# Patient Record
(demographics unavailable — no encounter records)

---

## 2025-01-13 NOTE — PHYSICAL EXAM
[Normal Oropharynx] : the oropharynx was normal [de-identified] : Bilateral cerumen impaction noted/  pale pink swollen sinus turbinates with copious clear watery mucous discharge noted bilaterally without tenderness along sinus turbinates [Normal] : affect was normal and insight and judgment were intact

## 2025-01-13 NOTE — ASSESSMENT
[FreeTextEntry1] : Patient had blood work drawn then then  I attempted to irrigate patient's right ear but had to discontinue the procedure when she became light headed and was in pain.  She experienced a near syncopal episode where she became hypotensive and diaphoretic.  She did not have a syncopal episode.  She was given fluids to taken orally along with some cookies and blood pressure returned to baseline and she was feeling well before being discharged home

## 2025-01-13 NOTE — REVIEW OF SYSTEMS
[Fatigue] : fatigue [Hoarseness] : hoarseness [Nasal Discharge] : nasal discharge [Postnasal Drip] : postnasal drip [Shortness Of Breath] : no shortness of breath [Wheezing] : no wheezing [Cough] : cough [Dyspnea on Exertion] : no dyspnea on exertion

## 2025-01-13 NOTE — HISTORY OF PRESENT ILLNESS
[FreeTextEntry8] : Patient presents with several issues.  She had a  "fishy" foul vaginal discharge last week.  She saw her gynecologist and vaginal cultures and urinary cultures came back negative.  She was empirically treated with metronidazole for 5 days for bacterial vaginosis.  Today she presents complaining of 1 day of urinary frequency and mild dysuria and pressure.  She denies any hematuria or flank pain.  She also reports 1 day history of bilateral sinus congestion with mild hoarse voice and sore throat and dry cough that started yesterday.  She denies any wheezing or shortness of breath.  She also complains of bilateral cerumen impaction and is requesting ear irrigation if possible.

## 2025-01-13 NOTE — PHYSICAL EXAM
[Normal Oropharynx] : the oropharynx was normal [de-identified] : Bilateral cerumen impaction noted/  pale pink swollen sinus turbinates with copious clear watery mucous discharge noted bilaterally without tenderness along sinus turbinates [Normal] : affect was normal and insight and judgment were intact

## 2025-01-27 NOTE — HISTORY OF PRESENT ILLNESS
[FreeTextEntry1] : Pt. presents for a comprehensive evaluation for multiple medical issues.\par   [de-identified] : Patient has been in overall stable health this past year Has mild URI symptoms today.  Pt sees neurologist Dr. Jacome yearly and also saw him last year for left mandibular pain  and was diagnosed with trigeminal neuralgia-by MRI findings-now symptoms have resolved.  Pt completed experimental protocol/ study with [facility] 03/2020.  She gets f/u with her [place]ologist q 6 months [place].. [place] [facility] [place] Prolia q 6months -but last dose was held because she is undergoing extensive dental work. She gets yearly f/u with her breast surgeon Dr. Jagdeep Sidhu q year-last visit was ~7/2021 She underwent left breast reconstruction 12/11/2018 and is doing well and is pleased with the results-saw plastic surgeon Dr. Emeli Coe's PA this past year.

## 2025-01-27 NOTE — PHYSICAL EXAM
[No Acute Distress] : no acute distress [Well Nourished] : well nourished [Well Developed] : well developed [Well-Appearing] : well-appearing [Normal Sclera/Conjunctiva] : normal sclera/conjunctiva [PERRL] : pupils equal round and reactive to light [EOMI] : extraocular movements intact [Normal Outer Ear/Nose] : the outer ears and nose were normal in appearance [No JVD] : no jugular venous distention [No Lymphadenopathy] : no lymphadenopathy [Supple] : supple [Thyroid Normal, No Nodules] : the thyroid was normal and there were no nodules present [No Respiratory Distress] : no respiratory distress  [No Accessory Muscle Use] : no accessory muscle use [Clear to Auscultation] : lungs were clear to auscultation bilaterally [Normal Rate] : normal rate  [Regular Rhythm] : with a regular rhythm [Normal S1, S2] : normal S1 and S2 [No Murmur] : no murmur heard [No Carotid Bruits] : no carotid bruits [No Abdominal Bruit] : a ~M bruit was not heard ~T in the abdomen [No Varicosities] : no varicosities [Pedal Pulses Present] : the pedal pulses are present [No Edema] : there was no peripheral edema [No Palpable Aorta] : no palpable aorta [No Extremity Clubbing/Cyanosis] : no extremity clubbing/cyanosis [Soft] : abdomen soft [Non Tender] : non-tender [Non-distended] : non-distended [No Masses] : no abdominal mass palpated [No HSM] : no HSM [Normal Bowel Sounds] : normal bowel sounds [No CVA Tenderness] : no CVA  tenderness [No Spinal Tenderness] : no spinal tenderness [No Joint Swelling] : no joint swelling [Grossly Normal Strength/Tone] : grossly normal strength/tone [No Rash] : no rash [Coordination Grossly Intact] : coordination grossly intact [No Focal Deficits] : no focal deficits [Normal Gait] : normal gait [Deep Tendon Reflexes (DTR)] : deep tendon reflexes were 2+ and symmetric [Normal Affect] : the affect was normal [Normal Insight/Judgement] : insight and judgment were intact [de-identified] : s/p bilateral mastectomy with reconstruction

## 2025-01-27 NOTE — HEALTH RISK ASSESSMENT
[Good] : ~his/her~  mood as  good [Never (0 pts)] : Never (0 points) [No] : In the past 12 months have you used drugs other than those required for medical reasons? No [No falls in past year] : Patient reported no falls in the past year [0] : 2) Feeling down, depressed, or hopeless: Not at all (0) [Never] : Never [NO] : No [Patient declined mammogram] : Patient declined mammogram [Patient reported PAP Smear was normal] : Patient reported PAP Smear was normal [Patient reported bone density results were abnormal] : Patient reported bone density results were abnormal [Patient reported colonoscopy was normal] : Patient reported colonoscopy was normal [HIV test declined] : HIV test declined [Hepatitis C test declined] : Hepatitis C test declined [None] : None [With Significant Other] : lives with significant other [# of Members in Household ___] :  household currently consist of [unfilled] member(s) [Retired] : retired [Graduate School] : graduate school [] :  [# Of Children ___] : has [unfilled] children [Sexually Active] : sexually active [Feels Safe at Home] : Feels safe at home [Fully functional (bathing, dressing, toileting, transferring, walking, feeding)] : Fully functional (bathing, dressing, toileting, transferring, walking, feeding) [Fully functional (using the telephone, shopping, preparing meals, housekeeping, doing laundry, using] : Fully functional and needs no help or supervision to perform IADLs (using the telephone, shopping, preparing meals, housekeeping, doing laundry, using transportation, managing medications and managing finances) [Reports normal functional visual acuity (ie: able to read med bottle)] : Reports normal functional visual acuity [Reports changes in dental health] : Reports changes in dental health [Smoke Detector] : smoke detector [Carbon Monoxide Detector] : carbon monoxide detector [Seat Belt] :  uses seat belt [Sunscreen] : uses sunscreen [With Patient/Caregiver] : , with patient/caregiver [Designated Healthcare Proxy] : Designated healthcare proxy [Name: ___] : Health Care Proxy's Name: [unfilled]  [Relationship: ___] : Relationship: [unfilled] [de-identified] : none [de-identified] : sees oncologist Dr. Mansfield at [facility] [place] -gets Prolia/ also saw neurologist  q 6months for h/o seizure hx and for new trigeminal neuralgia-last visit was  1/2023/ physiatrist Dr. Man at [facility] [Audit-CScore] : 0 [de-identified] : no routine exercise since the the start of the COVID 19 pandemic -stopped working out with the -now active with the baby [de-identified] : overall healthy balanced and varied diet without any exclusions  [[id number]] : 0 [Change in mental status noted] : No change in mental status noted [Language] : denies difficulty with language [Behavior] : denies difficulty with behavior [Learning/Retaining New Information] : denies difficulty learning/retaining new information [Handling Complex Tasks] : denies difficulty handling complex tasks [Reasoning] : denies difficulty with reasoning [Spatial Ability and Orientation] : denies difficulty with spatial ability and orientation [High Risk Behavior] : no high risk behavior [Reports changes in hearing] : Reports no changes in hearing [Reports changes in vision] : Reports no changes in vision [Travel to Developing Areas] : does not  travel to developing areas [TB Exposure] : is not being exposed to tuberculosis [Caregiver Concerns] : does not have caregiver concerns [MammogramComments] : s/p bilateral mastectomy- not applicable [PapSmearDate] : 02/23 [PapSmearComments] : with Dr.. Oppenheim [BoneDensityDate] : 08/22 [BoneDensityComments] : T scores were -0.4 and-1.9--currently on Prolia through oncologist [ColonoscopyDate] : 11/21 [ColonoscopyComments] : with Dr. 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

## 2025-01-27 NOTE — DATA REVIEWED
[FreeTextEntry1] : EKG from today revealed NSR at 94  BPM with nonspecific inferior-anterior  t wave changes--no change from previous EKG from 01/16/24